# Patient Record
Sex: MALE | Employment: UNEMPLOYED | URBAN - METROPOLITAN AREA
[De-identification: names, ages, dates, MRNs, and addresses within clinical notes are randomized per-mention and may not be internally consistent; named-entity substitution may affect disease eponyms.]

---

## 2021-11-27 ENCOUNTER — APPOINTMENT (OUTPATIENT)
Dept: GENERAL RADIOLOGY | Facility: CLINIC | Age: 21
End: 2021-11-27
Attending: EMERGENCY MEDICINE
Payer: COMMERCIAL

## 2021-11-27 ENCOUNTER — HOSPITAL ENCOUNTER (EMERGENCY)
Facility: CLINIC | Age: 21
Discharge: HOME | End: 2021-11-27
Attending: EMERGENCY MEDICINE | Admitting: EMERGENCY MEDICINE
Payer: COMMERCIAL

## 2021-11-27 VITALS
TEMPERATURE: 98.3 F | OXYGEN SATURATION: 97 % | RESPIRATION RATE: 16 BRPM | DIASTOLIC BLOOD PRESSURE: 63 MMHG | HEART RATE: 79 BPM | WEIGHT: 143 LBS | HEIGHT: 68 IN | BODY MASS INDEX: 21.67 KG/M2 | SYSTOLIC BLOOD PRESSURE: 121 MMHG

## 2021-11-27 DIAGNOSIS — R06.00 DYSPNEA, UNSPECIFIED TYPE: ICD-10-CM

## 2021-11-27 PROCEDURE — 250N000009 HC RX 250: Performed by: EMERGENCY MEDICINE

## 2021-11-27 PROCEDURE — 94640 AIRWAY INHALATION TREATMENT: CPT

## 2021-11-27 PROCEDURE — 99284 EMERGENCY DEPT VISIT MOD MDM: CPT | Mod: 25

## 2021-11-27 PROCEDURE — 250N000013 HC RX MED GY IP 250 OP 250 PS 637: Performed by: EMERGENCY MEDICINE

## 2021-11-27 PROCEDURE — 71045 X-RAY EXAM CHEST 1 VIEW: CPT

## 2021-11-27 PROCEDURE — 93005 ELECTROCARDIOGRAM TRACING: CPT

## 2021-11-27 RX ORDER — ALBUTEROL SULFATE 90 UG/1
4 AEROSOL, METERED RESPIRATORY (INHALATION) ONCE
Status: COMPLETED | OUTPATIENT
Start: 2021-11-27 | End: 2021-11-27

## 2021-11-27 RX ORDER — OXYMETAZOLINE HYDROCHLORIDE 0.05 G/100ML
2 SPRAY NASAL ONCE
Status: COMPLETED | OUTPATIENT
Start: 2021-11-27 | End: 2021-11-27

## 2021-11-27 RX ADMIN — Medication 2 SPRAY: at 19:49

## 2021-11-27 RX ADMIN — ALBUTEROL SULFATE 4 PUFF: 90 AEROSOL, METERED RESPIRATORY (INHALATION) at 19:49

## 2021-11-27 ASSESSMENT — MIFFLIN-ST. JEOR: SCORE: 1623.14

## 2021-11-28 NOTE — ED PROVIDER NOTES
"[facility] ED Provider Note  [facility]  11:21 PM  2021    Maco Osuna  20 year oldmale    Chief Complaint   Patient presents with     Shortness of Breath       HPI:  20-year-old male here with a sensation of dyspnea and restless leg since starting Abilify a week ago.  He has been hospitalized for mental health at [facility] on .  States his mental health is currently stable no active homicidal suicidal ideation no hallucinations.  Depression improving.  Has never been on Abilify before.  Denies any chest pain, fever, sore throat, cough.  Is also taking Remeron, Prozac.      ROS: ROS is negative other than mentioned above in HPI    Past medical history, past surgical history, social history, medications, allergies all reviewed in care everywhere.     No Known Allergies      Physical Exam  /63   Pulse 79   Temp 98.3  F (36.8  C) (Temporal)   Resp 16   Ht 1.727 m (5' 8\")   Wt 64.9 kg (143 lb)   SpO2 97%   BMI 21.74 kg/m        Gen: Resting comfortably   CV: ppi, regular   Resp: speaking in full sentences with any resp distress, lungs clear to auscultation bilaterally  Skin: warm dry well perfused  Remedy: No edema  Neuro: Alert, no gross motor or sensory deficits,  gait stable      Labs and Imaging:    Labs Ordered and Resulted from Time of ED Arrival to Time of ED Departure - No data to display      XR Chest Port 1 View   Final Result   IMPRESSION: Negative chest.        ECG showing no evidence of acute ischemia or arrhythmia.  Medications Administered in ED:  Medications   albuterol (PROVENTIL HFA/VENTOLIN HFA) inhaler (4 puffs Inhalation Given 21)   oxymetazoline (AFRIN) 0.05 % spray 2 spray (2 sprays Nasal Given 21)           Medical Decision Makin-year-old male here with a sensation of dyspnea after recently starting Abilify.  Nontoxic in appearance with a normal cardiopulmonary examination.  EKG shows " no concern for underlying interval or rhythm issues.  Radiograph negative.  I am not hearing an infectious story.  I think he is safe to continue this medication and has follow-up already with psychiatry as an outpatient to discuss medication effect as well as side effects.      Diagnosis:    ICD-10-CM    1. Dyspnea, unspecified type  R06.00        Disposition:  Home      Catarino Anaya MD  [facility]     Catarino Anaya MD  11/27/21 [id number]

## 2021-11-28 NOTE — ED TRIAGE NOTES
Pt started on Abilify a week ago. Pt states he has been having trouble breathing and restless legs because of it. Pt states the shortness of breath is worse with talking or wearing a mask. Pt denies cough or pain.

## 2021-11-29 LAB
ATRIAL RATE - MUSE: 64 BPM
DIASTOLIC BLOOD PRESSURE - MUSE: NORMAL MMHG
INTERPRETATION ECG - MUSE: NORMAL
P AXIS - MUSE: 73 DEGREES
PR INTERVAL - MUSE: 166 MS
QRS DURATION - MUSE: 90 MS
QT - MUSE: 372 MS
QTC - MUSE: 383 MS
R AXIS - MUSE: 84 DEGREES
SYSTOLIC BLOOD PRESSURE - MUSE: NORMAL MMHG
T AXIS - MUSE: 59 DEGREES
VENTRICULAR RATE- MUSE: 64 BPM